# Patient Record
Sex: FEMALE | Race: WHITE
[De-identification: names, ages, dates, MRNs, and addresses within clinical notes are randomized per-mention and may not be internally consistent; named-entity substitution may affect disease eponyms.]

---

## 2017-01-13 ENCOUNTER — HOSPITAL ENCOUNTER (OUTPATIENT)
Dept: HOSPITAL 80 - FIMAGING | Age: 77
End: 2017-01-13
Attending: INTERNAL MEDICINE
Payer: COMMERCIAL

## 2017-01-13 DIAGNOSIS — R10.9: Primary | ICD-10-CM

## 2017-01-13 NOTE — DX
Pelvis - Single View dated January 13, 2017

 

Indication: Pain.

 

Technique: Single AP supine view.

 

Comparison: AP pelvis dated May 16, 2016.

 

Findings: Minimal symmetric sacroiliac and bilateral hip osteoarthritis is unchanged since May 2016. 
No fracture or bone lesion.

 

Impression:

1. Minimal osteoarthritis unchanged.

2. No acute fracture.

## 2017-01-30 ENCOUNTER — HOSPITAL ENCOUNTER (OUTPATIENT)
Dept: HOSPITAL 80 - FIMAGING | Age: 77
End: 2017-01-30
Attending: PODIATRIST
Payer: COMMERCIAL

## 2017-01-30 DIAGNOSIS — M77.31: Primary | ICD-10-CM

## 2017-01-30 NOTE — DX
Right Calcaneus, Two Views 



Indication: Heel pain. Evaluate for spur.



Technique: Axial and lateral views.



Comparison: None



Findings: The bones are normally mineralized. No sclerosis to suggest stress injury. No fracture or b
one lesion. A small plantar spur is present with no associated erosion.



Impression: Small plantar spur. No evidence of stress fracture or bone lesion.

## 2017-08-01 ENCOUNTER — HOSPITAL ENCOUNTER (OUTPATIENT)
Dept: HOSPITAL 80 - FIMAGING | Age: 77
End: 2017-08-01
Attending: PHYSICIAN ASSISTANT
Payer: COMMERCIAL

## 2017-08-01 DIAGNOSIS — R10.84: Primary | ICD-10-CM

## 2017-10-30 ENCOUNTER — HOSPITAL ENCOUNTER (OUTPATIENT)
Dept: HOSPITAL 80 - FIMAGING | Age: 77
End: 2017-10-30
Attending: INTERNAL MEDICINE
Payer: COMMERCIAL

## 2017-10-30 DIAGNOSIS — Z12.31: Primary | ICD-10-CM

## 2017-10-30 PROCEDURE — G0202 SCR MAMMO BI INCL CAD: HCPCS

## 2017-11-14 ENCOUNTER — HOSPITAL ENCOUNTER (OUTPATIENT)
Dept: HOSPITAL 80 - CIMAGING | Age: 77
End: 2017-11-14
Attending: PHYSICIAN ASSISTANT
Payer: COMMERCIAL

## 2017-11-14 DIAGNOSIS — K76.89: ICD-10-CM

## 2017-11-14 DIAGNOSIS — Z90.49: ICD-10-CM

## 2017-11-14 DIAGNOSIS — R19.7: ICD-10-CM

## 2017-11-14 DIAGNOSIS — R10.84: Primary | ICD-10-CM

## 2017-11-14 DIAGNOSIS — I70.0: ICD-10-CM

## 2017-11-14 PROCEDURE — 74177 CT ABD & PELVIS W/CONTRAST: CPT

## 2017-12-15 ENCOUNTER — HOSPITAL ENCOUNTER (OUTPATIENT)
Dept: HOSPITAL 80 - CIMAGING | Age: 77
End: 2017-12-15
Attending: INTERNAL MEDICINE
Payer: COMMERCIAL

## 2017-12-15 DIAGNOSIS — R63.4: ICD-10-CM

## 2017-12-15 DIAGNOSIS — I70.8: Primary | ICD-10-CM

## 2017-12-15 DIAGNOSIS — R10.9: ICD-10-CM

## 2017-12-15 PROCEDURE — 74174 CTA ABD&PLVS W/CONTRAST: CPT

## 2018-10-01 ENCOUNTER — HOSPITAL ENCOUNTER (OUTPATIENT)
Dept: HOSPITAL 80 - BMCIMAGING | Age: 78
End: 2018-10-01
Attending: INTERNAL MEDICINE
Payer: COMMERCIAL

## 2018-10-01 DIAGNOSIS — R63.4: Primary | ICD-10-CM

## 2018-10-10 ENCOUNTER — HOSPITAL ENCOUNTER (OUTPATIENT)
Dept: HOSPITAL 80 - FIMAGING | Age: 78
End: 2018-10-10
Attending: INTERNAL MEDICINE
Payer: COMMERCIAL

## 2018-10-10 DIAGNOSIS — R92.8: Primary | ICD-10-CM
